# Patient Record
(demographics unavailable — no encounter records)

---

## 2025-01-29 NOTE — PHYSICAL EXAM
[Alert] : alert [No Acute Distress] : no acute distress [No Neck Mass] : no neck mass was observed [No LAD] : no lymphadenopathy [Normal Rate and Effort] : normal respiratory rhythm and effort [No Crackles] : no crackles [No Retractions] : no retractions [Wheezing] : no wheezing was heard [Normal Rate] : heart rate was normal  [Regular Rhythm] : with a regular rhythm [Normal Cervical Lymph Nodes] : cervical [Skin Intact] : skin intact  [No Rash] : no rash [Normal Mood] : mood was normal [Normal Affect] : affect was normal [Judgment and Insight Age Appropriate] : judgement and insight is age appropriate [Alert, Awake, Oriented as Age-Appropriate] : alert, awake, oriented as age appropriate [de-identified] : obese male

## 2025-01-29 NOTE — IMPRESSION
[Spirometry] : Spirometry [Normal Spirometry] : spirometry normal [FreeTextEntry1] : mild decrease in FVC and FEV 1 secondary to weight

## 2025-01-29 NOTE — HISTORY OF PRESENT ILLNESS
[de-identified] : Patient is treated with Arnuity - Singulair - albuterol prn - azelastine BID prn - he has been taking Reybelsus - lost 75 pounds. - he runs out of azelastine - doesn't last one complete month -

## 2025-01-29 NOTE — ASSESSMENT
[FreeTextEntry1] : Mild persistent asthma:  Continue Arnuity 200 QD Continue Singulair QD Continue albuterol 2 puffs QID prn   Perennial allergic rhinitis:  Lower  azelastine 1 puff BID   RV 1 year or prn